# Patient Record
Sex: FEMALE | Race: BLACK OR AFRICAN AMERICAN | NOT HISPANIC OR LATINO | Employment: OTHER | URBAN - METROPOLITAN AREA
[De-identification: names, ages, dates, MRNs, and addresses within clinical notes are randomized per-mention and may not be internally consistent; named-entity substitution may affect disease eponyms.]

---

## 2020-09-27 DIAGNOSIS — U07.1 COVID-19 VIRUS DETECTED: ICD-10-CM

## 2020-09-28 ENCOUNTER — TELEPHONE (OUTPATIENT)
Dept: PRIMARY CARE CLINIC | Facility: OTHER | Age: 60
End: 2020-09-28

## 2020-09-28 DIAGNOSIS — U07.1 COVID-19 VIRUS INFECTION: Primary | ICD-10-CM

## 2020-10-04 ENCOUNTER — NURSE TRIAGE (OUTPATIENT)
Dept: ADMINISTRATIVE | Facility: CLINIC | Age: 60
End: 2020-10-04

## 2020-10-04 NOTE — TELEPHONE ENCOUNTER
Pt contacted through Covid Symptom tracking for an escalation of symptoms. Pt states she is just calling in to let us know that she has been quarantined for 14 days and that she has had no symptoms and is doing fine. States she plans on re-resting tomorrow. Advised pt to call back with concerns or questions. Verbalized understanding.

## 2021-05-04 PROBLEM — E66.01 MORBID OBESITY: Status: ACTIVE | Noted: 2021-05-04

## 2021-05-04 PROBLEM — E11.9 DIABETES: Status: ACTIVE | Noted: 2021-05-04

## 2021-05-04 PROBLEM — M47.816 DEGENERATIVE JOINT DISEASE (DJD) OF LUMBAR SPINE: Status: ACTIVE | Noted: 2021-05-04

## 2021-05-04 PROBLEM — K57.92 DIVERTICULITIS: Status: ACTIVE | Noted: 2021-05-04
